# Patient Record
Sex: FEMALE | Race: WHITE
[De-identification: names, ages, dates, MRNs, and addresses within clinical notes are randomized per-mention and may not be internally consistent; named-entity substitution may affect disease eponyms.]

---

## 2018-05-22 ENCOUNTER — HOSPITAL ENCOUNTER (OUTPATIENT)
Dept: HOSPITAL 92 - RAD | Age: 83
Discharge: HOME | End: 2018-05-22
Attending: INTERNAL MEDICINE
Payer: MEDICARE

## 2018-05-22 DIAGNOSIS — Z98.890: ICD-10-CM

## 2018-05-22 DIAGNOSIS — R06.00: ICD-10-CM

## 2018-05-22 DIAGNOSIS — I70.90: Primary | ICD-10-CM

## 2018-05-22 PROCEDURE — 71046 X-RAY EXAM CHEST 2 VIEWS: CPT

## 2018-05-22 NOTE — RAD
CHEST 2 VIEWS:

 

COMPARISON: 

2/26/07, 5/12/17.

 

FINDINGS: 

Stable postoperative change in the right breast.  Atherosclerosis of the aorta.  Normal cardiac silho
uette.  The pulmonary vessels and hilum are normal.  Costophrenic angles are clear.  Chronic changes 
in the lung parenchyma.  Hyperinflation is noted.  There is no pneumothorax.  Diffuse bony deminerali
zation.

 

IMPRESSION: 

Chronic changes.  Atherosclerosis.

 

POS: [facility]

## 2018-06-06 ENCOUNTER — HOSPITAL ENCOUNTER (OUTPATIENT)
Dept: HOSPITAL 92 - ERS | Age: 83
Setting detail: OBSERVATION
LOS: 1 days | Discharge: HOME | End: 2018-06-07
Attending: INTERNAL MEDICINE | Admitting: INTERNAL MEDICINE
Payer: MEDICARE

## 2018-06-06 VITALS — BODY MASS INDEX: 31.1 KG/M2

## 2018-06-06 DIAGNOSIS — G45.9: Primary | ICD-10-CM

## 2018-06-06 DIAGNOSIS — I10: ICD-10-CM

## 2018-06-06 DIAGNOSIS — Z79.899: ICD-10-CM

## 2018-06-06 DIAGNOSIS — Z86.73: ICD-10-CM

## 2018-06-06 DIAGNOSIS — E78.5: ICD-10-CM

## 2018-06-06 DIAGNOSIS — Z66: ICD-10-CM

## 2018-06-06 DIAGNOSIS — K21.9: ICD-10-CM

## 2018-06-06 DIAGNOSIS — Z88.5: ICD-10-CM

## 2018-06-06 DIAGNOSIS — M81.0: ICD-10-CM

## 2018-06-06 DIAGNOSIS — Z79.02: ICD-10-CM

## 2018-06-06 LAB
ANION GAP SERPL CALC-SCNC: 12 MMOL/L (ref 10–20)
APTT PPP: 30.4 SEC (ref 22.9–36.1)
BASOPHILS # BLD AUTO: 0 THOU/UL (ref 0–0.2)
BASOPHILS NFR BLD AUTO: 0.4 % (ref 0–1)
BUN SERPL-MCNC: 15 MG/DL (ref 9.8–20.1)
CALCIUM SERPL-MCNC: 9.7 MG/DL (ref 7.8–10.44)
CHLORIDE SERPL-SCNC: 103 MMOL/L (ref 98–107)
CO2 SERPL-SCNC: 30 MMOL/L (ref 23–31)
CREAT CL PREDICTED SERPL C-G-VRATE: 0 ML/MIN (ref 70–130)
EOSINOPHIL # BLD AUTO: 0.1 THOU/UL (ref 0–0.7)
EOSINOPHIL NFR BLD AUTO: 1.6 % (ref 0–10)
GLUCOSE SERPL-MCNC: 87 MG/DL (ref 83–110)
HGB BLD-MCNC: 15.6 G/DL (ref 12–16)
INR PPP: 1
LYMPHOCYTES # BLD: 1.3 THOU/UL (ref 1.2–3.4)
LYMPHOCYTES NFR BLD AUTO: 18.8 % (ref 21–51)
MCH RBC QN AUTO: 29.1 PG (ref 27–31)
MCV RBC AUTO: 91.3 FL (ref 81–99)
MONOCYTES # BLD AUTO: 0.6 THOU/UL (ref 0.11–0.59)
MONOCYTES NFR BLD AUTO: 8.7 % (ref 0–10)
NEUTROPHILS # BLD AUTO: 4.9 THOU/UL (ref 1.4–6.5)
NEUTROPHILS NFR BLD AUTO: 70.5 % (ref 42–75)
PLATELET # BLD AUTO: 268 THOU/UL (ref 130–400)
POTASSIUM SERPL-SCNC: 4.2 MMOL/L (ref 3.5–5.1)
PROTHROMBIN TIME: 12.9 SEC (ref 12–14.7)
RBC # BLD AUTO: 5.34 MILL/UL (ref 4.2–5.4)
SODIUM SERPL-SCNC: 141 MMOL/L (ref 136–145)
TROPONIN I SERPL DL<=0.01 NG/ML-MCNC: (no result) NG/ML (ref ?–0.03)
WBC # BLD AUTO: 7 THOU/UL (ref 4.8–10.8)

## 2018-06-06 PROCEDURE — 80061 LIPID PANEL: CPT

## 2018-06-06 PROCEDURE — 93880 EXTRACRANIAL BILAT STUDY: CPT

## 2018-06-06 PROCEDURE — 85730 THROMBOPLASTIN TIME PARTIAL: CPT

## 2018-06-06 PROCEDURE — 70551 MRI BRAIN STEM W/O DYE: CPT

## 2018-06-06 PROCEDURE — 97530 THERAPEUTIC ACTIVITIES: CPT

## 2018-06-06 PROCEDURE — 85610 PROTHROMBIN TIME: CPT

## 2018-06-06 PROCEDURE — G0378 HOSPITAL OBSERVATION PER HR: HCPCS

## 2018-06-06 PROCEDURE — 99285 EMERGENCY DEPT VISIT HI MDM: CPT

## 2018-06-06 PROCEDURE — 84484 ASSAY OF TROPONIN QUANT: CPT

## 2018-06-06 PROCEDURE — 96372 THER/PROPH/DIAG INJ SC/IM: CPT

## 2018-06-06 PROCEDURE — 36415 COLL VENOUS BLD VENIPUNCTURE: CPT

## 2018-06-06 PROCEDURE — 70450 CT HEAD/BRAIN W/O DYE: CPT

## 2018-06-06 PROCEDURE — 80048 BASIC METABOLIC PNL TOTAL CA: CPT

## 2018-06-06 PROCEDURE — 93005 ELECTROCARDIOGRAM TRACING: CPT

## 2018-06-06 PROCEDURE — 85025 COMPLETE CBC W/AUTO DIFF WBC: CPT

## 2018-06-06 PROCEDURE — 97139 UNLISTED THERAPEUTIC PX: CPT

## 2018-06-06 NOTE — ULT
BILATERAL CAROTID DUPLEX ULTRASOUND:

6/6/18

 

HISTORY: 

TIA.

 

TECHNIQUE:  

Gray scale, color flow, and spectral doppler imaging of the extracranial carotid artery system is per
formed bilaterally. 

 

FINDINGS:  

There is plaque formation on either side. 

 

The peak systolic velocity in the right ICA measures 75 cm/s with and end diastolic velocity of 22 cm
/s and systolic ratio of 0.85. 

 

The peak systolic velocity in the left ICA measures 95 cm/s with and end diastolic velocity of 9 cm/s
 and systolic ratio of 1.28. 

 

The vertebral arteries could not be satisfactorily evaluated due to interference from hearing aids.

 

IMPRESSION:  

No evidence of hemodynamically significant stenosis in either ICA. 

 

POS: [facility]

## 2018-06-06 NOTE — CT
CT OF HEAD NONCONTRAST:

 

Date:  06/06/18

 

INDICATION:

Altered mental status. 

 

FINDINGS:

The ventricular system is prominent in size and there is periventricular white matter hypoattenuation
. No hemorrhage, mass effect, or midline shift. There is mild scattered paranasal sinus mucosal thick
ening. There are lacunar infarctions bilateral basal ganglia. 

 

IMPRESSION: 

1.  Prominence of the ventricular system with periventricular white matter hypoattenuation. No signif
icant parenchymal atrophy. Correlate clinically for normal pressure hydrocephalus, which is a clinica
l diagnosis. Alternatively, the prominent ventricular system could relate to degree of ex vacuo dilat
ation in the setting that the periventricular white matter hypodensities correspond to chronic ischem
ic disease. 

 

2.  Note is made that size of ventricular system does not correspond to degree of intracranial pressu
re. 

 

POS: [facility]

## 2018-06-06 NOTE — HP
REASON FOR ADMISSION:  TIA.

 

HISTORY OF PRESENTING ILLNESS:  The patient gives history of an episode 
happened around 2:00 p.m. yesterday where she could not focus and was talking 
gibberish.  She also developed left arm and facial paraesthesias.  This lasted 
for nearly 20 minutes.  It resolved on its own.  She told her granddaughter who 
is a nurse and was asked to come to the emergency room today.  No complaints of 
chest pain, palpitation, or PND.  Patient has chronic cough due to her prior 
smoking history.  She uses cane to ambulate.  The patient states she has been a 
little more unsteady than usual from yesterday after the episode.

 

PAST MEDICAL AND SURGICAL HISTORY:  History of prior TIAs x3 with no residual 
paralysis, hypertension, dyslipidemia, COPD, macular degeneration, history of 
diverticulosis, GERD, osteoporosis, history of breast cancer with right breast 
lumpectomy and radiation done at [facility] in .  She has completed 5 
years of tamoxifen therapy.  Total abdominal hysterectomy with bilateral 
salpingo-oophorectomy due to fibroids, right wrist surgery with plate and screws
, history of left wrist surgery as well.

 

CURRENT MEDICATIONS:  Omeprazole 20 mg daily, potassium chloride 20 mEq p.o. 
daily, L-arginine 500 mg p.o. daily, alprazolam 0.25 mg p.o. q.4 hourly p.r.n., 
Advair Diskus 250/50 mcg daily, Symbicort inhaler 160/4.5 mcg 2 puffs twice 
daily, DuoNebs q.6 hourly p.r.n., Plavix 75 mg p.o. daily, vitamin D 3000 units 
p.o. daily, Lasix 40 mg p.o. daily, Crestor 40 mg p.o. at bedtime, Norvasc 5 mg 
daily.

 

ALLERGIES:  CODEINE.

 

PERSONAL HISTORY:  Quit smoking 20 years ago, prior to which has smoked 1-2 
packs a day for nearly 30-40 years.  Does not abuse alcohol or drugs.

 

FAMILY HISTORY:  Mother  in her childbirth in her 30s.  Father lived up to 
80 years and had pneumonia prior to his death.  Had a son who  of non-
Hodgkin's lymphoma in his 30s, had 4 brothers who are , many of them 
had cancer of the lung and MI.

 

REVIEW OF SYSTEMS:  The following complete review of systems was negative, 
unless otherwise mentioned in the HPI or below:

Constitutional:  Weight loss or gain, ability to conduct usual activities.

Skin:  Rash, itching.

Eyes:  Double vision, pain.

ENT/Mouth:  Nose bleeding, neck stiffness, pain, tenderness.

Cardiovascular:  Palpitations, dyspnea on exertion, orthopnea.

Respiratory:  Shortness of breath, wheezing, cough, hemoptysis, fever or night 
sweats.

Gastrointestinal:  Poor appetite, abdominal pain, heartburn, nausea, vomiting, 
constipation, or diarrhea.

Genitourinary:  Urgency, frequency, dysuria, nocturia.

Musculoskeletal:  Pain, swelling.

Neurologic/Psychiatric:  Anxiety, depression.

Allergy/Immunologic:  Skin rash, bleeding tendency.

 

CODE STATUS:  Patient is DNR per prior records and this was confirmed with 
patient.

 

PHYSICAL EXAMINATION:

GENERAL:  The patient is an 89-year-old female who is currently not in any 
acute distress.

VITAL SIGNS:  Blood pressure 190/60 on arrival, subsequent pressures have been 
140/50, pulse 76 per minute, respiratory rate 18 per minute, temperature 97.9 
degrees Fahrenheit, saturating 95% on room air.

NECK:  Supple, no elevated JVD.

EYES:  Extraocular muscles intact.  Pupils reacting to light.

ORAL CAVITY:  Mucous membranes are moist.  No exudates or congestion.

CARDIOVASCULAR SYSTEM:  S1, S2 heard.  Regular rhythm.

RESPIRATORY SYSTEM:  Air entry 1+ bilateral.  Scattered rhonchi plus no wheezes.

ABDOMEN:  Soft, bowel sounds heard.  No tenderness, rigidity or guarding.

EXTREMITIES:  No peripheral edema or calf tenderness.

VASCULAR SYSTEM:  Peripheral pulses 1+ bilateral, no ischemic ulcerations or 
gangrene.

CENTRAL NERVOUS SYSTEM:  Cranial nerves are grossly intact.  Motor system 
strength is 5/5 in all 4 extremities.  Patient is right handed.  Reflexes are 2
+ bilateral.  Babinski is downgoing.  Cerebellar signs are grossly intact.  
Gait was not tested.

PSYCHIATRIC SYSTEM:  The patient's mood is euthymic.  No hallucinations or 
delusions.

 

IMAGING DATA AND LABORATORY DATA:  CT brain done shows no acute infarct seen.  
There is prominence of ventricular system.  White count of 7, hemoglobin and 
hematocrit 15 and 48, platelet count 268, MCV is 91 with 70% neutrophils.  PT, 
INR, and PTT within normal limits.  Electrolytes are stable.  BUN 15, 
creatinine 0.8, glucose 87.  First set of cardiac enzymes are negative.  EKG 
done shows sinus rhythm at 71 beats per minute.  There is poor R-wave 
progression seen.

 

CLINICAL IMPRESSION AND PLAN:  The patient will be under observation on 
telemetry for possible TIA with word finding difficulty and paraesthesias on 
the left upper extremity and face.  We will continue her on Plavix and add 
aspirin as well for her current stay and continue Crestor.  We will obtain MRI 
of the brain and ultrasound of the carotids.  PT/OT evaluations will be 
requested.  We will continue her home medications including Norvasc and DuoNebs 
as before.  We will continue to closely monitor her for any hemodynamic 
compromise. Code status is DNR.

 

MTDD

## 2018-06-06 NOTE — MRI
MRI OF BRAIN PERFORMED WITHOUT CONTRAST ENHANCEMENT:

6/6/18

 

HISTORY: 

Altered mental status. TIA. 

 

COMPARISON:  

CT done earlier today. 

 

Ventricular system is again noted to be mildly prominent. There is fairly prominent white matter sign
al change on the T2 and FLAIR sequences. On the diffusion weighted sequence, I see no signs that woul
d suggest any type of infarct. No intra or extra-axial mass. Mastoid air cells and visualized sinuses
 are clear.

 

IMPRESSION:  

Atrophy of white matter changes most likely on the basis of chronic white matter change. The ventricu
lar system does appear slightly more prominent than the sulcal prominence. Possibility of normal pres
sure hydrocephalus should be a consideration. 

 

POS: [facility]

## 2018-06-07 VITALS — DIASTOLIC BLOOD PRESSURE: 85 MMHG | SYSTOLIC BLOOD PRESSURE: 170 MMHG

## 2018-06-07 VITALS — TEMPERATURE: 98 F

## 2018-06-07 LAB
ANION GAP SERPL CALC-SCNC: 11 MMOL/L (ref 10–20)
BASOPHILS # BLD AUTO: 0 THOU/UL (ref 0–0.2)
BASOPHILS NFR BLD AUTO: 0.4 % (ref 0–1)
BUN SERPL-MCNC: 18 MG/DL (ref 9.8–20.1)
CALCIUM SERPL-MCNC: 9 MG/DL (ref 7.8–10.44)
CHD RISK SERPL-RTO: 2.8 (ref ?–4.5)
CHLORIDE SERPL-SCNC: 105 MMOL/L (ref 98–107)
CHOLEST SERPL-MCNC: 112 MG/DL
CO2 SERPL-SCNC: 31 MMOL/L (ref 23–31)
CREAT CL PREDICTED SERPL C-G-VRATE: 56 ML/MIN (ref 70–130)
EOSINOPHIL # BLD AUTO: 0.2 THOU/UL (ref 0–0.7)
EOSINOPHIL NFR BLD AUTO: 2.4 % (ref 0–10)
GLUCOSE SERPL-MCNC: 91 MG/DL (ref 83–110)
HDLC SERPL-MCNC: 40 MG/DL
HGB BLD-MCNC: 14 G/DL (ref 12–16)
LDLC SERPL CALC-MCNC: 48 MG/DL
LYMPHOCYTES # BLD: 2.1 THOU/UL (ref 1.2–3.4)
LYMPHOCYTES NFR BLD AUTO: 33.8 % (ref 21–51)
MCH RBC QN AUTO: 29.2 PG (ref 27–31)
MCV RBC AUTO: 89.2 FL (ref 81–99)
MONOCYTES # BLD AUTO: 0.8 THOU/UL (ref 0.11–0.59)
MONOCYTES NFR BLD AUTO: 13 % (ref 0–10)
NEUTROPHILS # BLD AUTO: 3.1 THOU/UL (ref 1.4–6.5)
NEUTROPHILS NFR BLD AUTO: 50.4 % (ref 42–75)
PLATELET # BLD AUTO: 248 THOU/UL (ref 130–400)
POTASSIUM SERPL-SCNC: 3.9 MMOL/L (ref 3.5–5.1)
RBC # BLD AUTO: 4.77 MILL/UL (ref 4.2–5.4)
SODIUM SERPL-SCNC: 143 MMOL/L (ref 136–145)
TRIGL SERPL-MCNC: 118 MG/DL (ref ?–150)
WBC # BLD AUTO: 6.2 THOU/UL (ref 4.8–10.8)

## 2018-06-08 NOTE — DIS
DATE OF ADMISSION:  06/06/2018

 

DATE OF DISCHARGE:  06/07/2018 

 

DISCHARGE DISPOSITION:  To home.

 

PRIMARY DISCHARGE DIAGNOSIS:  Transient ischemic attack, resolved.

 

SECONDARY DISCHARGE DIAGNOSES:  Hypertension, dyslipidemia, chronic obstructive 
pulmonary disease.

 

PROCEDURES DONE DURING HOSPITALIZATION:  The patient has had ultrasound carotid 
Doppler done which showed no evidence of hemodynamically significant stenosis.  
MRI brain showed atrophy of white matter, most likely on the basis of chronic 
white matter change.  Ventricular system does appear to be slightly more 
prominent than the sulcal prominence.  No acute infarct was seen.

 

Total cholesterol 112, triglycerides 118, LDL 48, HDL 40.  Troponin x3 
negative.  

 

DISCHARGE MEDICATIONS:  Albuterol inhaler q.4 hourly p.r.n., Norvasc 5 mg p.o. 
q.a.m., Plavix 75 mg p.o. q.a.m., Breo Ellipta 1 puff twice daily, Lasix 40 mg 
p.o. q.a.m., omeprazole 20 mg p.o. at bedtime, potassium chloride 20 mEq p.o. 
daily, Crestor 40 mg p.o. at bedtime.

 

ALLERGIES:  CODEINE.

 

DISCHARGE PLAN:  The patient to follow up with primary care physician in 1 week.

 

BRIEF COURSE DURING HOSPITALIZATION:  The patient initially was brought to the 
ER after she had an episode of not able to focus and was confused.  She also 
developed left arm and facial paraesthesia, which lasted nearly 20 minutes.  In 
view of this history the patient was placed under observation on the stroke 
unit for possible TIA.  She has had complete stroke workup done which has been 
negative for any acute stroke.  All her symptoms completely got resolved even 
prior to her hospitalization.  She has remained hemodynamically stable and 
neurologically stable.  She will be shortly discharged home.  Please see a face-
to-face documentation on [facility] for the day of discharge.

 

ELLIE

## 2018-06-11 NOTE — PDOC.PN
- Subjective


Encounter Start Date: 06/07/18


Encounter Start Time: 07:00


Subjective: no sob or weakness, feels good


-: daughter at bedside





- Objective


Resuscitation Status: 


 











Resuscitation Status           DNR:Do Not Resuscitate














MAR Reviewed: Yes


Vital Signs & Weight: 


 Weight











Weight                         165 lb 3 oz














Result Diagrams: 


 06/07/18 05:30





 06/07/18 05:30





Phys Exam





- Physical Examination


HEENT: PERRLA, moist MMs


Neck: no JVD, supple


Respiratory: no wheezing, no rales


Cardiovascular: RRR, no significant murmur


Gastrointestinal: soft, non-tender, positive bowel sounds


Musculoskeletal: no edema, pulses present


Neurological: non-focal, moves all 4 limbs


Psychiatric: normal affect, A&O x 3





Dx/Plan


(1) TIA (transient ischemic attack)


Status: Resolved   





(2) COPD (chronic obstructive pulmonary disease)


Status: Chronic   


Qualifiers: 


   COPD type: chronic bronchitis   Chronic bronchitis type: unspecified   

Qualified Code(s): J42 - Unspecified chronic bronchitis   





(3) HTN (hypertension)


Code(s): I10 - ESSENTIAL (PRIMARY) HYPERTENSION   Status: Chronic   


Qualifiers: 


   Hypertension type: essential hypertension   Qualified Code(s): I10 - 

Essential (primary) hypertension   





(4) Dyslipidemia


Code(s): E78.5 - HYPERLIPIDEMIA, UNSPECIFIED   Status: Chronic   





- Plan


MRI and usg carotid results noted with no ac abnormalities


-: hemo/neuro stable


-: dc pt home


-: d/w patient and family at bedside





* .

## 2018-06-14 ENCOUNTER — HOSPITAL ENCOUNTER (OUTPATIENT)
Dept: HOSPITAL 92 - CP | Age: 83
Discharge: HOME | End: 2018-06-14
Attending: INTERNAL MEDICINE
Payer: MEDICARE

## 2018-06-14 DIAGNOSIS — J43.8: Primary | ICD-10-CM

## 2018-06-14 PROCEDURE — 94727 GAS DIL/WSHOT DETER LNG VOL: CPT

## 2018-06-14 PROCEDURE — 94729 DIFFUSING CAPACITY: CPT

## 2018-06-14 PROCEDURE — 94060 EVALUATION OF WHEEZING: CPT

## 2018-07-21 ENCOUNTER — HOSPITAL ENCOUNTER (INPATIENT)
Dept: HOSPITAL 92 - ERS | Age: 83
LOS: 3 days | Discharge: HOME | DRG: 872 | End: 2018-07-24
Attending: HOSPITALIST | Admitting: HOSPITALIST
Payer: MEDICARE

## 2018-07-21 ENCOUNTER — HOSPITAL ENCOUNTER (EMERGENCY)
Dept: HOSPITAL 9 - MADERS | Age: 83
Discharge: ANOTHER HOSPITAL | End: 2018-07-21
Payer: MEDICARE

## 2018-07-21 VITALS — BODY MASS INDEX: 26.3 KG/M2

## 2018-07-21 DIAGNOSIS — E78.5: ICD-10-CM

## 2018-07-21 DIAGNOSIS — J44.1: ICD-10-CM

## 2018-07-21 DIAGNOSIS — I10: ICD-10-CM

## 2018-07-21 DIAGNOSIS — J44.9: ICD-10-CM

## 2018-07-21 DIAGNOSIS — Z87.891: ICD-10-CM

## 2018-07-21 DIAGNOSIS — Z79.899: ICD-10-CM

## 2018-07-21 DIAGNOSIS — F41.9: ICD-10-CM

## 2018-07-21 DIAGNOSIS — K21.9: ICD-10-CM

## 2018-07-21 DIAGNOSIS — N39.0: ICD-10-CM

## 2018-07-21 DIAGNOSIS — Z86.73: ICD-10-CM

## 2018-07-21 DIAGNOSIS — A41.9: Primary | ICD-10-CM

## 2018-07-21 DIAGNOSIS — Z85.3: ICD-10-CM

## 2018-07-21 DIAGNOSIS — E87.6: ICD-10-CM

## 2018-07-21 DIAGNOSIS — Z99.81: ICD-10-CM

## 2018-07-21 DIAGNOSIS — J96.10: ICD-10-CM

## 2018-07-21 LAB
ALBUMIN SERPL BCG-MCNC: 3.7 G/DL (ref 3.4–4.8)
ALP SERPL-CCNC: 68 U/L (ref 40–150)
ALT SERPL W P-5'-P-CCNC: 17 U/L (ref 8–55)
ANION GAP SERPL CALC-SCNC: 21 MMOL/L (ref 10–20)
AST SERPL-CCNC: 19 U/L (ref 5–34)
BACTERIA UR QL AUTO: (no result) HPF
BILIRUB SERPL-MCNC: 0.5 MG/DL (ref 0.2–1.2)
BUN SERPL-MCNC: 14 MG/DL (ref 9.8–20.1)
CALCIUM SERPL-MCNC: 9.1 MG/DL (ref 7.8–10.44)
CHLORIDE SERPL-SCNC: 99 MMOL/L (ref 98–107)
CK MB SERPL-MCNC: 0.9 NG/ML (ref 0–6.6)
CO2 SERPL-SCNC: 25 MMOL/L (ref 23–31)
CREAT CL PREDICTED SERPL C-G-VRATE: 0 ML/MIN (ref 70–130)
GLOBULIN SER CALC-MCNC: 3.1 G/DL (ref 2.4–3.5)
GLUCOSE SERPL-MCNC: 160 MG/DL (ref 83–110)
HGB BLD-MCNC: 13 G/DL (ref 12–16)
MCH RBC QN AUTO: 27.3 PG (ref 27–31)
MCV RBC AUTO: 85.7 FL (ref 78–98)
MDIFF COMPLETE?: YES
PLATELET # BLD AUTO: 276 THOU/UL (ref 130–400)
PLATELET BLD QL SMEAR: (no result)
POTASSIUM SERPL-SCNC: 3.6 MMOL/L (ref 3.5–5.1)
PROT UR STRIP.AUTO-MCNC: 30 MG/DL
RBC # BLD AUTO: 4.77 MILL/UL (ref 4.2–5.4)
RBC UR QL AUTO: (no result) HPF (ref 0–3)
SODIUM SERPL-SCNC: 141 MMOL/L (ref 136–145)
SP GR UR STRIP: 1.02 (ref 1–1.03)
TROPONIN I SERPL DL<=0.01 NG/ML-MCNC: 0.02 NG/ML (ref ?–0.03)
WBC # BLD AUTO: 11.9 THOU/UL (ref 4.8–10.8)

## 2018-07-21 PROCEDURE — 85025 COMPLETE CBC W/AUTO DIFF WBC: CPT

## 2018-07-21 PROCEDURE — 85379 FIBRIN DEGRADATION QUANT: CPT

## 2018-07-21 PROCEDURE — 80053 COMPREHEN METABOLIC PANEL: CPT

## 2018-07-21 PROCEDURE — 87086 URINE CULTURE/COLONY COUNT: CPT

## 2018-07-21 PROCEDURE — 83605 ASSAY OF LACTIC ACID: CPT

## 2018-07-21 PROCEDURE — 96365 THER/PROPH/DIAG IV INF INIT: CPT

## 2018-07-21 PROCEDURE — 36415 COLL VENOUS BLD VENIPUNCTURE: CPT

## 2018-07-21 PROCEDURE — 81015 MICROSCOPIC EXAM OF URINE: CPT

## 2018-07-21 PROCEDURE — 87040 BLOOD CULTURE FOR BACTERIA: CPT

## 2018-07-21 PROCEDURE — 94640 AIRWAY INHALATION TREATMENT: CPT

## 2018-07-21 PROCEDURE — 71046 X-RAY EXAM CHEST 2 VIEWS: CPT

## 2018-07-21 PROCEDURE — 81003 URINALYSIS AUTO W/O SCOPE: CPT

## 2018-07-21 PROCEDURE — 93005 ELECTROCARDIOGRAM TRACING: CPT

## 2018-07-21 PROCEDURE — 51701 INSERT BLADDER CATHETER: CPT

## 2018-07-21 PROCEDURE — 94760 N-INVAS EAR/PLS OXIMETRY 1: CPT

## 2018-07-21 PROCEDURE — 84484 ASSAY OF TROPONIN QUANT: CPT

## 2018-07-21 PROCEDURE — 99285 EMERGENCY DEPT VISIT HI MDM: CPT

## 2018-07-21 PROCEDURE — 82553 CREATINE MB FRACTION: CPT

## 2018-07-21 NOTE — RAD
TWO VIEW CHEST:

7/21/18

 

HISTORY: 

Fever.

 

COMPARISON:  

12/20/16.

 

Diffuse increased interstitial markings appear stable. Apical pleural thickening and apical opacities
 appears stable. Heart size is upper normal and stable. No new infiltrate. Numerous surgical clips ov
erlie the right breast. Aortic calcification. 

 

IMPRESSION:  

There appear to be chronic lung parenchymal changes which appear stable from 12/20/16.

 

 

 

POS: [facility]

## 2018-07-22 RX ADMIN — MOMETASONE FUROATE AND FORMOTEROL FUMARATE DIHYDRATE SCH PUFF: 100; 5 AEROSOL RESPIRATORY (INHALATION) at 19:01

## 2018-07-22 RX ADMIN — MAGNESIUM HYDROXIDE PRN ML: 400 SUSPENSION ORAL at 08:54

## 2018-07-22 RX ADMIN — ALUMINUM HYDROXIDE AND MAGNESIUM HYDROXIDE PRN ML: 200; 200 SUSPENSION ORAL at 21:10

## 2018-07-22 RX ADMIN — CEFTRIAXONE SCH MLS: 1 INJECTION, POWDER, FOR SOLUTION INTRAMUSCULAR; INTRAVENOUS at 08:44

## 2018-07-22 NOTE — HP
PRIMARY CARE PHYSICIAN:  Laure Mitchell M.D.

 

REASON FOR ADMISSION:  Transfer from [facility] for urinary tract infection and seps
is.

 

HISTORY OF PRESENT ILLNESS:  An 89-year-old female who went to [facility] for evalua
tion of fever.  She had 101.5 fever at the [facility].  She was complaining of low b
ack pain and weakness.  She was also complaining of shortness of breath.  She was feeling that she is
 getting another pneumonia and that is why she wanted to check out and that is why she went to [facility].  She was expecting her discharge from the ER, but subsequently as she was febr
ile and she was saturating 87% on room air, she was also appeared little bit short of breath in the e
mergency room and that is why ER physician transferred her to our emergency room for possible COPD fl
areup as well as UTI and sepsis.

 

She was evaluated in our emergency room last night.  At that time, she was afebrile.  She was hemodyn
amically stable.  This patient has chronic respiratory failure and mostly she is using oxygen during 
night time.  This patient was already admitted from the ER to our hospital as a full admission.

 

This morning around 7:00, I evaluated her and she was feeling much better.  She did not have any furt
her fever.  She was feeling that her shortness of breath is chronic, which is baseline.  Patient repo
rts that she is dealing with a postnasal drip lately, because of allergies and that precipitating her
 chronic obstructive pulmonary disease flare-up.  She denies any cough, hemoptysis.  She denies any p
leuritic chest pain.  She denies any recent travel or sick exposure.  Even though, she does not have 
any UTI symptoms as well other than just low back pain, she denies any constipation, diarrhea, melena
, hematochezia.  She is able to ambulate by herself.

 

REVIEW OF SYSTEMS:  The following complete review of systems was negative, unless otherwise mentioned
 in the HPI or below:

Constitutional:  Weight loss or gain, ability to conduct usual activities.

Skin:  Rash, itching.

Eyes:  Double vision, pain.

ENT/Mouth:  Nose bleeding, neck stiffness, pain, tenderness.

Cardiovascular:  Palpitations, dyspnea on exertion, orthopnea.

Respiratory:  Shortness of breath, wheezing, cough, hemoptysis, fever or night sweats.

Gastrointestinal:  Poor appetite, abdominal pain, heartburn, nausea, vomiting, constipation, or diarr
hea.

Genitourinary:  Urgency, frequency, dysuria, nocturia.

Musculoskeletal:  Pain, swelling.

Neurologic/Psychiatric:  Anxiety, depression.

Allergy/Immunologic:  Skin rash, bleeding tendency.

 

Please see my HPI for pertinent positive and negative.  All other review of system reviewed and negat
naga except as mentioned in the HPI.

 

PAST MEDICAL HISTORY:  History of TIA x3 without any residual deficit, hypertension, dyslipidemia, CO
PD, chronic respiratory failure on nocturnal oxygen therapy, macular degeneration, diverticulosis, ga
stroesophageal reflux disease, osteoporosis, history of breast cancer with a right breast lumpectomy 
and radiation therapy done at [facility] in .  Subsequently, she also completed 5 years of tamox
ifen therapy.

 

PAST SURGICAL HISTORY:  Total abdominal hysterectomy, bilateral salpingo-oophorectomy for fibroid, ri
ght wrist surgery with plates and screws, left wrist surgery.

 

PAST PSYCHIATRIC HISTORY:  The patient has anxiety disorder.

 

SOCIAL HISTORY:  Patient is a former smoker.  She quit smoking more than 20 years ago.  She denies an
y alcohol abuse.  She denies any other illicit drug abuse.  She lives at home with her family.

 

FAMILY HISTORY:  Mother  in her childbirth in her 30s.  Father lived up to 80 years and he passed
 away from pneumonia.  Son who  from non-Hodgkin's lymphoma in his 30s and for brother also disea
sed and they had lung cancer and heart disease.

 

ALLERGIES:  CODEINE.  She is not tolerating well.

 

EMERGENCY ROOM COURSE:  At [facility], she was given Zosyn and Tylenol.  In our [facility] room, the patient did not given any medication.

 

CURRENT HOME MEDICATIONS:  Ventolin HFA 2 puffs q.6 hourly p.r.n., Xanax 0.25 mg p.o. t.i.d. p.r.n., 
amlodipine 5 mg p.o. daily, Plavix 75 mg p.o. daily, Breo Ellipta 1 inhalation b.i.d., Lasix 40 mg p.
o. daily, DuoNeb q.4 hours p.r.n., Ativan 0.5 mg p.o. at bedtime p.r.n., lorazepam 1 mg p.o. at bedti
me, mometasone topical application b.i.d. p.r.n., naproxen 500 mg p.o. b.i.d., nitroglycerin p.r.n., 
omeprazole 20 mg p.o. daily, Zofran 4 mg q.6 hours p.r.n., Crestor 40 mg p.o. at bedtime, potassium c
hloride 20 mEq p.o. daily.

 

PHYSICAL EXAMINATION:

VITAL SIGNS:  At the [facility], fever of 101.5, blood pressure 140/65, saturation 8
7% on room air, pulse 96, respiratory rate 20.

GENERAL:  Patient is currently alert, oriented, no acute distress.

HEENT:  Head:  Normocephalic, atraumatic.  Eyes:  Pupils round, reactive to light.  Extraocular muscl
e intact.  ENT:  Oropharynx within normal limits.  Moist mucous membranes, no oral lesion, no pharyng
eal erythema, no exudate.

NECK:  Supple, no JVD, no thyromegaly, no carotid bruit.

LUNGS:  Clear to auscultation without any rhonchi or rales.  No accessory muscles of respiration in u
se.

CARDIAC:  S1, S2 regular without any murmur.

ABDOMEN:  Soft, bowel sounds present, nontender, nondistended.  No organomegaly, no mass, no suprapub
ic tenderness.

BACK:  Unremarkable, no CVA tenderness.

EXTREMITIES:  Upper extremity, passive movement of all joints are normal.  Lower extremity, no edema.
  Good peripheral pulsation.

SKIN:  No skin rash.

HEMATOLOGICAL:  No lymphadenopathy.

PSYCHIATRIC:  Normal affect.

 

SIGNIFICANT LABORATORY DATA:  CBC:  WBC 11.9, hemoglobin 13.0, platelet 276 with a left shift.  D-dim
er 0.42.  Sodium 141, potassium 3.6, chloride 99, carbon dioxide 25, anion gap 21, BUN 14, creatinine
 0.84, glucose 160, calcium 9.1, lactic acid 1.2.  LFT:  AST 19, ALT 17, alkaline phosphatase 68, alb
umin 3.7, CK-MB 0.9, troponin 0.021.  Urinalysis:  Leukocyte esterase moderate.

 

X-RAY FINDINGS:  Chest x-ray based on my review, chronic changes without any acute process.

 

ASSESSMENT AND PLAN:

1.  Sepsis.  The patient has leukocytosis with a left shift, high grade fever and source of infection
 is urinary tract infection.  Patient is kept on broad spectrum antibiotic therapy with Rocephin and 
levofloxacin and will follow up on culture result and based on culture result, we will change antibio
tic therapy accordingly.

2.  Urinary tract infection.  Patient is on Rocephin and levofloxacin.  We will follow up on urine cu
lture result and we will change antibiotic therapy accordingly.

3.  Chronic respiratory failure on oxygen therapy.  The patient will continue her oxygen 2 liter nasa
l cannula to maintain saturation above 90%.

4.  Chronic obstructive pulmonary disease with possible mild exacerbation, now patient feels up to he
r baseline level.  She does not have any further exacerbation at this point.  May be because of fever
, she was tachypneic at the [facility].  Currently, she will continue her Breo Ellip
ta, DuoNeb therapy q.6 hourly, Ventolin inhaler on a p.r.n. basis.  She does not need any further kathryn
roid, because she is not in further exacerbations.

5.  Anxiety disorder.  We will continue Xanax 0.25 mg p.o. t.i.d. p.r.n., lorazepam 1 mg p.o. at bedt
luis miguel p.r.n.

6.  Gastroesophageal reflux disease.  We will continue Protonix 40 mg p.o. daily.

7.  Dyslipidemia.  We will continue Crestor 40 mg p.o. at bedtime.

8.  Hypertension.  We will continue amlodipine 5 mg p.o. daily.

9.  History of transient ischemic attack.  We will continue Plavix 75 mg p.o. daily.

10.  Deep venous thrombosis prophylaxis.  Lovenox 40 mg subcu daily.

11.  Gastrointestinal prophylaxis.  Protonix 40 mg p.o. daily.

 

CODE STATUS:  The patient is FULL CODE.

 

Disposition plan based on clinical course, based on culture result.  Once we have culture result back
, then we will consider changing to oral antibiotic therapy and then consider discharge.  Plan of car
e discussed with the patient in detail.

## 2018-07-23 LAB
ALBUMIN SERPL BCG-MCNC: 3.3 G/DL (ref 3.4–4.8)
ALP SERPL-CCNC: 62 U/L (ref 40–150)
ALT SERPL W P-5'-P-CCNC: 16 U/L (ref 8–55)
ANION GAP SERPL CALC-SCNC: 10 MMOL/L (ref 10–20)
AST SERPL-CCNC: 20 U/L (ref 5–34)
BASOPHILS # BLD AUTO: 0 THOU/UL (ref 0–0.2)
BASOPHILS NFR BLD AUTO: 0.2 % (ref 0–1)
BILIRUB SERPL-MCNC: 0.3 MG/DL (ref 0.2–1.2)
BUN SERPL-MCNC: 13 MG/DL (ref 9.8–20.1)
CALCIUM SERPL-MCNC: 8.6 MG/DL (ref 7.8–10.44)
CHLORIDE SERPL-SCNC: 100 MMOL/L (ref 98–107)
CO2 SERPL-SCNC: 33 MMOL/L (ref 23–31)
CREAT CL PREDICTED SERPL C-G-VRATE: 55 ML/MIN (ref 70–130)
EOSINOPHIL # BLD AUTO: 0.3 THOU/UL (ref 0–0.7)
EOSINOPHIL NFR BLD AUTO: 3.3 % (ref 0–10)
GLOBULIN SER CALC-MCNC: 2.9 G/DL (ref 2.4–3.5)
GLUCOSE SERPL-MCNC: 113 MG/DL (ref 83–110)
HGB BLD-MCNC: 12.2 G/DL (ref 12–16)
LYMPHOCYTES # BLD: 1 THOU/UL (ref 1.2–3.4)
LYMPHOCYTES NFR BLD AUTO: 12.8 % (ref 21–51)
MCH RBC QN AUTO: 29.5 PG (ref 27–31)
MCV RBC AUTO: 89.7 FL (ref 78–98)
MONOCYTES # BLD AUTO: 0.9 THOU/UL (ref 0.11–0.59)
MONOCYTES NFR BLD AUTO: 11.6 % (ref 0–10)
NEUTROPHILS # BLD AUTO: 5.8 THOU/UL (ref 1.4–6.5)
NEUTROPHILS NFR BLD AUTO: 72.1 % (ref 42–75)
PLATELET # BLD AUTO: 240 THOU/UL (ref 130–400)
POTASSIUM SERPL-SCNC: 3.3 MMOL/L (ref 3.5–5.1)
RBC # BLD AUTO: 4.12 MILL/UL (ref 4.2–5.4)
SODIUM SERPL-SCNC: 140 MMOL/L (ref 136–145)
WBC # BLD AUTO: 8 THOU/UL (ref 4.8–10.8)

## 2018-07-23 RX ADMIN — ALUMINUM HYDROXIDE AND MAGNESIUM HYDROXIDE PRN ML: 200; 200 SUSPENSION ORAL at 20:42

## 2018-07-23 RX ADMIN — MOMETASONE FUROATE AND FORMOTEROL FUMARATE DIHYDRATE SCH PUFF: 100; 5 AEROSOL RESPIRATORY (INHALATION) at 19:51

## 2018-07-23 RX ADMIN — MAGNESIUM HYDROXIDE PRN ML: 400 SUSPENSION ORAL at 20:41

## 2018-07-23 RX ADMIN — MOMETASONE FUROATE AND FORMOTEROL FUMARATE DIHYDRATE SCH PUFF: 100; 5 AEROSOL RESPIRATORY (INHALATION) at 06:48

## 2018-07-23 RX ADMIN — CEFTRIAXONE SCH MLS: 1 INJECTION, POWDER, FOR SOLUTION INTRAMUSCULAR; INTRAVENOUS at 09:17

## 2018-07-23 NOTE — DIS
DATE OF ADMISSION:  07/22/2018

 

DATE OF DISCHARGE:  07/24/2018

 

DISCHARGE DISPOSITION:  Home.

 

PRIMARY DISCHARGE DIAGNOSES:

1.  Sepsis, resolved.

2.  Urinary tract infection due to gram negative asim.

3.  Hypokalemia, corrected.

4.  Chronic obstructive pulmonary disease exacerbation, resolved.

 

SECONDARY DISCHARGE DIAGNOSES:

1.  Hypertension.

2.  Gastroesophageal reflux disease.

3.  Dyslipidemia.

4.  Anxiety disorder.

5.  Chronic obstructive pulmonary disease.

6.  Chronic respiratory failure with home oxygen therapy.

 

PRIMARY PROCEDURE/OPERATION:  None.

 

RADIOLOGICAL INVESTIGATION:  Chest x-ray was normal, chronic changes.

 

SIGNIFICANT LABORATORY DATA:  WBC 8.0, hemoglobin 12.2, platelet 240.  Sodium 140, potassium 3.3, BUN
 13, creatinine 0.80, calcium 8.6.  LFT normal.  Lactic acid 0.6.  Urine culture growing gram negativ
e asim.  Blood culture is negative.

 

DISCHARGE MEDICATIONS:  Levofloxacin 500 mg p.o. daily for 7 days, amlodipine 5 mg p.o. daily, Plavix
 75 mg p.o. daily, Breo Ellipta 1 inhalation b.i.d., Lasix 40 mg p.o. daily, naproxen 500 mg p.o. b.i
.d. only, omeprazole 20 mg p.o. daily, Crestor 40 mg p.o. at bedtime, potassium chloride 20 mEq p.o. 
daily, Ventolin inhaler 2 puffs q.6 hourly p.r.n., Xanax 0.25 mg t.i.d., DuoNeb q.4 hours p.r.n., joaquin
azepam 0.5-1 mg p.o. at bedtime p.r.n., mometasone topical application b.i.d. p.r.n., nitroglycerin p
.r.n., Zofran 4 mg q.6 hourly p.r.n.

 

CONTRAINDICATIONS:  None.

 

CODE STATUS:  FULL CODE.

 

INPATIENT CONSULTANTS:  None.

 

ALLERGIES:  CODEINE.

 

DISCHARGE PLAN:  Post hospital, the patient is instructed to follow up with primary care physician in
 1 week.

 

HOSPITAL COURSE:  An 89-year-old female who was having high grade fever at home and she was feeling c
hills and weak.  She was also short of breath at the same time.  She was suspected by herself a new p
neumonia as she was recently hospitalized for pneumonia.  She went to [facility] whe
re chest x-ray did not show any obvious pneumonia.  It did show chronic changes.  She was having high
 grade fever.  She appeared to be short of breath with a fever.  She was suspected for COPD flare up 
at [facility] and she was sent to our emergency room for further evaluation and andrez
tment.  She was admitted to medical floor.  She was treated with Rocephin and levofloxacin.  During t
his admission, she does not have any pneumonia, but she was found with urinary tract infection.  Ashley
ent's urine culture grew gram negative asim, official culture and sensitivity result is pending by the
 time of dictation.  Patient is otherwise absolutely normal to go home.  We are waiting for culture a
nd sensitivity result, we will consider discharging her home on p.o. Levaquin for another 7 days.

 

The patient is doing very well.  She is vital wise stable.  Her COPD is also baseline.  The patient w
ill follow up with primary care physician after discharge in 1 week.

 

All new medication prescription sent to her pharmacy.

 

The patient is seen and examined at bedside today.

## 2018-07-24 VITALS — DIASTOLIC BLOOD PRESSURE: 87 MMHG | SYSTOLIC BLOOD PRESSURE: 150 MMHG | TEMPERATURE: 98.3 F

## 2018-07-24 RX ADMIN — CEFTRIAXONE SCH MLS: 1 INJECTION, POWDER, FOR SOLUTION INTRAMUSCULAR; INTRAVENOUS at 08:18

## 2018-07-24 RX ADMIN — MOMETASONE FUROATE AND FORMOTEROL FUMARATE DIHYDRATE SCH PUFF: 100; 5 AEROSOL RESPIRATORY (INHALATION) at 06:39

## 2018-07-24 NOTE — ADD-DIS
ADDENDUM

 

The patient's urine culture showed gram-negative asim, but colony count was less than 10,000 and that 
is why she did not require any further waiting for culture and sensitivity result and that is why we 
are considering discharge to home with p.o. levofloxacin, which patient has significantly improved wi
th.  There is no change in my discharge summary dictated yesterday.  The patient is seen and examined
 at bedside today.  Please see my progress note from today for further detail.